# Patient Record
Sex: MALE | Race: WHITE | NOT HISPANIC OR LATINO
[De-identification: names, ages, dates, MRNs, and addresses within clinical notes are randomized per-mention and may not be internally consistent; named-entity substitution may affect disease eponyms.]

---

## 2017-04-11 PROBLEM — Z00.00 ENCOUNTER FOR PREVENTIVE HEALTH EXAMINATION: Status: ACTIVE | Noted: 2017-04-11

## 2017-05-12 ENCOUNTER — APPOINTMENT (OUTPATIENT)
Dept: HEART AND VASCULAR | Facility: CLINIC | Age: 37
End: 2017-05-12

## 2017-05-12 VITALS
DIASTOLIC BLOOD PRESSURE: 82 MMHG | OXYGEN SATURATION: 97 % | HEIGHT: 70 IN | TEMPERATURE: 97.9 F | RESPIRATION RATE: 12 BRPM | BODY MASS INDEX: 26.2 KG/M2 | HEART RATE: 128 BPM | WEIGHT: 183.02 LBS | SYSTOLIC BLOOD PRESSURE: 110 MMHG

## 2017-05-12 DIAGNOSIS — E78.5 HYPERLIPIDEMIA, UNSPECIFIED: ICD-10-CM

## 2017-05-12 DIAGNOSIS — R09.89 OTHER SPECIFIED SYMPTOMS AND SIGNS INVOLVING THE CIRCULATORY AND RESPIRATORY SYSTEMS: ICD-10-CM

## 2017-05-12 RX ORDER — BUPROPION HYDROCHLORIDE 300 MG/1
300 TABLET, EXTENDED RELEASE ORAL
Refills: 0 | Status: ACTIVE | COMMUNITY

## 2017-05-12 RX ORDER — VALACYCLOVIR HYDROCHLORIDE 1 G/1
1 TABLET, FILM COATED ORAL
Refills: 0 | Status: ACTIVE | COMMUNITY

## 2017-05-12 RX ORDER — EMTRICITABINE AND TENOFOVIR DISOPROXIL FUMARATE 200; 300 MG/1; MG/1
200-300 TABLET, FILM COATED ORAL
Refills: 0 | Status: ACTIVE | COMMUNITY

## 2018-06-01 ENCOUNTER — APPOINTMENT (OUTPATIENT)
Dept: HEART AND VASCULAR | Facility: CLINIC | Age: 38
End: 2018-06-01
Payer: COMMERCIAL

## 2018-06-01 VITALS
OXYGEN SATURATION: 98 % | WEIGHT: 176.04 LBS | TEMPERATURE: 98.3 F | BODY MASS INDEX: 25.2 KG/M2 | HEIGHT: 70 IN | RESPIRATION RATE: 12 BRPM | HEART RATE: 107 BPM | DIASTOLIC BLOOD PRESSURE: 86 MMHG | SYSTOLIC BLOOD PRESSURE: 106 MMHG

## 2018-06-01 VITALS — DIASTOLIC BLOOD PRESSURE: 80 MMHG | SYSTOLIC BLOOD PRESSURE: 104 MMHG

## 2018-06-01 DIAGNOSIS — Z87.891 PERSONAL HISTORY OF NICOTINE DEPENDENCE: ICD-10-CM

## 2018-06-01 DIAGNOSIS — Z87.898 PERSONAL HISTORY OF OTHER SPECIFIED CONDITIONS: ICD-10-CM

## 2018-06-01 DIAGNOSIS — F90.9 ATTENTION-DEFICIT HYPERACTIVITY DISORDER, UNSPECIFIED TYPE: ICD-10-CM

## 2018-06-01 PROCEDURE — 99214 OFFICE O/P EST MOD 30 MIN: CPT

## 2018-06-01 RX ORDER — NALOXONE HYDROCHLORIDE 1 MG/ML
2 INJECTION PARENTERAL
Refills: 0 | Status: DISCONTINUED | COMMUNITY
End: 2018-06-01

## 2018-06-01 RX ORDER — TADALAFIL 5 MG/1
5 TABLET, FILM COATED ORAL
Qty: 10 | Refills: 0 | Status: ACTIVE | COMMUNITY
Start: 2016-11-28

## 2018-06-01 RX ORDER — CICLOPIROX 10 MG/.96ML
1 SHAMPOO TOPICAL
Qty: 120 | Refills: 0 | Status: ACTIVE | COMMUNITY
Start: 2018-03-28

## 2018-06-01 RX ORDER — FLUOCINOLONE ACETONIDE 0.25 MG/G
0.03 OINTMENT TOPICAL
Qty: 60 | Refills: 0 | Status: ACTIVE | COMMUNITY
Start: 2018-04-16

## 2018-06-04 ENCOUNTER — TRANSCRIPTION ENCOUNTER (OUTPATIENT)
Age: 38
End: 2018-06-04

## 2018-06-11 ENCOUNTER — TRANSCRIPTION ENCOUNTER (OUTPATIENT)
Age: 38
End: 2018-06-11

## 2018-06-14 ENCOUNTER — APPOINTMENT (OUTPATIENT)
Dept: HEART AND VASCULAR | Facility: CLINIC | Age: 38
End: 2018-06-14
Payer: COMMERCIAL

## 2018-06-14 VITALS
TEMPERATURE: 98.2 F | DIASTOLIC BLOOD PRESSURE: 80 MMHG | RESPIRATION RATE: 12 BRPM | HEIGHT: 70 IN | SYSTOLIC BLOOD PRESSURE: 96 MMHG | WEIGHT: 175 LBS | HEART RATE: 120 BPM | OXYGEN SATURATION: 98 % | BODY MASS INDEX: 25.05 KG/M2

## 2018-06-14 DIAGNOSIS — R00.2 PALPITATIONS: ICD-10-CM

## 2018-06-14 PROCEDURE — 93306 TTE W/DOPPLER COMPLETE: CPT | Mod: XE

## 2018-06-14 PROCEDURE — 93015 CV STRESS TEST SUPVJ I&R: CPT

## 2018-06-14 PROCEDURE — 99213 OFFICE O/P EST LOW 20 MIN: CPT | Mod: 25

## 2018-07-06 ENCOUNTER — TRANSCRIPTION ENCOUNTER (OUTPATIENT)
Age: 38
End: 2018-07-06

## 2018-10-11 ENCOUNTER — APPOINTMENT (OUTPATIENT)
Dept: HEART AND VASCULAR | Facility: CLINIC | Age: 38
End: 2018-10-11
Payer: COMMERCIAL

## 2018-10-11 VITALS
DIASTOLIC BLOOD PRESSURE: 80 MMHG | WEIGHT: 174.04 LBS | RESPIRATION RATE: 12 BRPM | SYSTOLIC BLOOD PRESSURE: 94 MMHG | BODY MASS INDEX: 24.91 KG/M2 | HEIGHT: 70 IN | OXYGEN SATURATION: 97 % | HEART RATE: 116 BPM | TEMPERATURE: 98.1 F

## 2018-10-11 DIAGNOSIS — R00.0 TACHYCARDIA, UNSPECIFIED: ICD-10-CM

## 2018-10-11 PROCEDURE — 93000 ELECTROCARDIOGRAM COMPLETE: CPT

## 2018-10-11 PROCEDURE — 99214 OFFICE O/P EST MOD 30 MIN: CPT

## 2018-10-11 RX ORDER — ATOMOXETINE 60 MG/1
60 CAPSULE ORAL TWICE DAILY
Qty: 60 | Refills: 0 | Status: ACTIVE | COMMUNITY
Start: 2016-12-22

## 2024-05-08 ENCOUNTER — NON-APPOINTMENT (OUTPATIENT)
Age: 44
End: 2024-05-08

## 2024-05-29 ENCOUNTER — TRANSCRIPTION ENCOUNTER (OUTPATIENT)
Age: 44
End: 2024-05-29

## 2024-05-29 ENCOUNTER — APPOINTMENT (OUTPATIENT)
Dept: OTOLARYNGOLOGY | Facility: CLINIC | Age: 44
End: 2024-05-29
Payer: COMMERCIAL

## 2024-05-29 VITALS — WEIGHT: 172 LBS | HEIGHT: 70 IN | BODY MASS INDEX: 24.62 KG/M2

## 2024-05-29 DIAGNOSIS — H93.13 TINNITUS, BILATERAL: ICD-10-CM

## 2024-05-29 DIAGNOSIS — Z86.39 PERSONAL HISTORY OF OTHER ENDOCRINE, NUTRITIONAL AND METABOLIC DISEASE: ICD-10-CM

## 2024-05-29 DIAGNOSIS — Z86.19 PERSONAL HISTORY OF OTHER INFECTIOUS AND PARASITIC DISEASES: ICD-10-CM

## 2024-05-29 DIAGNOSIS — H93.293 OTHER ABNORMAL AUDITORY PERCEPTIONS, BILATERAL: ICD-10-CM

## 2024-05-29 DIAGNOSIS — F10.21 ALCOHOL DEPENDENCE, IN REMISSION: ICD-10-CM

## 2024-05-29 DIAGNOSIS — Z92.89 PERSONAL HISTORY OF OTHER MEDICAL TREATMENT: ICD-10-CM

## 2024-05-29 PROCEDURE — 99203 OFFICE O/P NEW LOW 30 MIN: CPT

## 2024-05-29 PROCEDURE — 92567 TYMPANOMETRY: CPT

## 2024-05-29 PROCEDURE — 92557 COMPREHENSIVE HEARING TEST: CPT

## 2024-05-29 RX ORDER — FINASTERIDE 1 MG/1
TABLET ORAL
Refills: 0 | Status: ACTIVE | COMMUNITY

## 2024-05-29 RX ORDER — BUPROPION HYDROCHLORIDE 100 MG/1
TABLET, FILM COATED ORAL
Refills: 0 | Status: ACTIVE | COMMUNITY

## 2024-05-29 NOTE — ASSESSMENT
[FreeTextEntry1] : He is intermittent bilateral tinnitus for the past several months.  His ears were normal on exam.  Audiogram was normal.  The tinnitus does not last long and he has no associated symptoms  PLAN  - findings and management options discussed with the patient.  - Good aural hygiene. - noise precautions - repeat audiogram in 1 to 2 years - literature regarding tinnitus given - Avoid substances that can make tinnitus worse.   - They may use a white noise maker or leave the tv/radio on when it is quiet  -If the tinnitus is bothersome, he may consider tinnitus therapy - follow up in 1 year - call and return earlier if any concerns or worsening symptom

## 2024-05-29 NOTE — PHYSICAL EXAM
[TextEntry] : PHYSICAL EXAM  General: The patient was alert, oriented and in no distress. Voice was clear.  Face: The patient had no facial asymmetry or mass. The skin was unremarkable.  Ears: Hearing normal to conversational voice External ears were normal without deformity. Ear canals were clear. No cerumen or inflammation Tympanic membranes were intact and normal. No perforation or effusion. mobile  Nose:  The external nose had no significant deformity.   . On anterior rhinoscopy, the nasal mucosa was clear.  The anterior septum was grossly midline. There were no visualized polyps, purulence  or masses.   Oral cavity: Oral mucosa- normal. Oral and base of tongue- clear and without mass. Gingival and buccal mucosa- moist and without lesions. Palate- the palate moved well. There was no cleft palate. There appeared to be good salivary flow.   Oral cavity/oropharynx- no pus, erythema or mass  Neck:  The neck was symmetrical. The parotid and submandibular glands were normal without masses. The trachea was midline and there was no unusual crepitus. Thyroid was smooth and nontender and no masses were palpated. No masses  Lymphatics: Cervical adenopathy- none.     AUDIOGRAM- test ordered and the results reviewed and discussed with the patient. [] Hearing-normal Word recognition-normal Tympanograms- AD- type A, AS- type A

## 2024-05-29 NOTE — HISTORY OF PRESENT ILLNESS
[de-identified] : JUANCHO WITT is a 43 year old patient With a 5-month history of intermittent bilateral tinnitus.  It typically occurs in the morning and lasts a few minutes. It is a high pitched tone. It is not pulsatile.  He has no otalgia, otorrhea, dizziness, or change in his hearing.  He has no nasal or throat symptoms.  He had recurrent ear infections when younger but no history of ear surgery, ear trauma, excessive noise exposure, or family history of hearing loss He has not had a recent audiogram

## 2024-05-30 PROBLEM — H93.293 ABNORMAL AUDITORY PERCEPTION OF BOTH EARS: Status: ACTIVE | Noted: 2024-05-30

## 2024-05-30 PROBLEM — H93.13 TINNITUS, BILATERAL: Status: ACTIVE | Noted: 2024-05-29
